# Patient Record
Sex: FEMALE | Race: WHITE | NOT HISPANIC OR LATINO | Employment: UNEMPLOYED | URBAN - METROPOLITAN AREA
[De-identification: names, ages, dates, MRNs, and addresses within clinical notes are randomized per-mention and may not be internally consistent; named-entity substitution may affect disease eponyms.]

---

## 2018-05-18 NOTE — PATIENT DISCUSSION
Cataract symptoms i.e., glare, blur discussed. Pt to call if worsening vision or trouble with driving, TV, reading, ADL. UV precautions. Reviewed possibility of future cataract surgery.  Suggest appointment with Dr. Maricruz Nogueira to review options:  suggestion is for [facility] laser cataract correction.

## 2018-07-26 NOTE — PATIENT DISCUSSION
The patient has been advised of an uncertain visual outcome secondary to concomitant ocular disease (macular degeneration).

## 2019-11-14 NOTE — PATIENT DISCUSSION
Conjunctivitis precautions explained to prevent risk of spread.  call STAT if red increased/pain/dec VA.  Tobradex drops QID OS for 7 days see me if NI or worse.

## 2019-12-04 NOTE — PATIENT DISCUSSION
Start Valtrex 500 mg bid PO.  add Viroptic q2h while awake for 3 days then decrease to QID until RTC.

## 2019-12-10 NOTE — PATIENT DISCUSSION
12/4/19 Start Valtrex 500 mg bid PO.  add Viroptic q2h while awake for 3 days then decrease to QID until RTC.

## 2019-12-10 NOTE — PATIENT DISCUSSION
12/10/19 finish po Valtrex as Rx.  Viroptic now QID today then TID for 3 days and BID for 3 days and DC.  call and see me if pain/red/dec VA.

## 2020-08-10 ENCOUNTER — NEW PATIENT COMPREHENSIVE (OUTPATIENT)
Dept: URBAN - METROPOLITAN AREA CLINIC 38 | Facility: CLINIC | Age: 65
End: 2020-08-10

## 2020-08-10 DIAGNOSIS — H01.02A: ICD-10-CM

## 2020-08-10 DIAGNOSIS — H52.4: ICD-10-CM

## 2020-08-10 DIAGNOSIS — H01.02B: ICD-10-CM

## 2020-08-10 DIAGNOSIS — H04.123: ICD-10-CM

## 2020-08-10 DIAGNOSIS — H52.03: ICD-10-CM

## 2020-08-10 DIAGNOSIS — H25.813: ICD-10-CM

## 2020-08-10 PROCEDURE — 92015 DETERMINE REFRACTIVE STATE: CPT

## 2020-08-10 PROCEDURE — 92004 COMPRE OPH EXAM NEW PT 1/>: CPT

## 2020-08-10 ASSESSMENT — KERATOMETRY
OS_AXISANGLE_DEGREES: 160
OS_K1POWER_DIOPTERS: 41.75
OS_AXISANGLE2_DEGREES: 70
OD_AXISANGLE2_DEGREES: 115
OD_AXISANGLE_DEGREES: 25
OD_K1POWER_DIOPTERS: 42
OD_K2POWER_DIOPTERS: 42.5

## 2020-08-10 ASSESSMENT — VISUAL ACUITY
OS_SC: 20/25-1
OD_SC: 20/30-2

## 2020-08-10 ASSESSMENT — TONOMETRY
OS_IOP_MMHG: 17
OD_IOP_MMHG: 16

## 2022-05-05 NOTE — PATIENT DISCUSSION
Patient understands condition, prognosis and need for follow up care.  pt ed needs baseline glc testing to assure this is not early glc.  ed glc can cause vision loss and even blindness without appropriate FU care and serial analysis.

## 2022-06-08 NOTE — PATIENT DISCUSSION
6/8/2022: at this point HVF is full.  IOP remains a bit elevated but has thicker CCT so true IOP likely lower vs measured.  angle wide open.  monitor at this time.